# Patient Record
Sex: FEMALE | Race: BLACK OR AFRICAN AMERICAN | NOT HISPANIC OR LATINO | Employment: STUDENT | URBAN - METROPOLITAN AREA
[De-identification: names, ages, dates, MRNs, and addresses within clinical notes are randomized per-mention and may not be internally consistent; named-entity substitution may affect disease eponyms.]

---

## 2023-05-15 ENCOUNTER — OFFICE VISIT (OUTPATIENT)
Dept: PRIMARY CARE | Facility: CLINIC | Age: 18
End: 2023-05-15
Payer: COMMERCIAL

## 2023-05-15 VITALS
WEIGHT: 108 LBS | SYSTOLIC BLOOD PRESSURE: 130 MMHG | BODY MASS INDEX: 21.2 KG/M2 | DIASTOLIC BLOOD PRESSURE: 80 MMHG | HEIGHT: 60 IN | OXYGEN SATURATION: 98 % | HEART RATE: 116 BPM

## 2023-05-15 DIAGNOSIS — Z11.1 SCREENING-PULMONARY TB: ICD-10-CM

## 2023-05-15 DIAGNOSIS — E55.9 VITAMIN D DEFICIENCY: Primary | ICD-10-CM

## 2023-05-15 PROCEDURE — 3008F BODY MASS INDEX DOCD: CPT | Performed by: STUDENT IN AN ORGANIZED HEALTH CARE EDUCATION/TRAINING PROGRAM

## 2023-05-15 PROCEDURE — 99203 OFFICE O/P NEW LOW 30 MIN: CPT | Performed by: STUDENT IN AN ORGANIZED HEALTH CARE EDUCATION/TRAINING PROGRAM

## 2023-05-15 PROCEDURE — 82306 VITAMIN D 25 HYDROXY: CPT

## 2023-05-15 PROCEDURE — 1036F TOBACCO NON-USER: CPT | Performed by: STUDENT IN AN ORGANIZED HEALTH CARE EDUCATION/TRAINING PROGRAM

## 2023-05-15 PROCEDURE — 86481 TB AG RESPONSE T-CELL SUSP: CPT

## 2023-05-15 NOTE — PATIENT INSTRUCTIONS
Thank you for coming today Karine!    Please get your blood work done. The lab is on floor 1 in [address] or on [address] in [address].     I will fill out physical form and guardianship papers today and give you a call for when they are ready.     I will see you in December for your physical and as needed.    Have a great summer!

## 2023-05-15 NOTE — PROGRESS NOTES
Subjective   Patient ID: Karine Garcia is a 17 y.o. female with history of CP (bilateral hand stiffness), ASD, acne who presents to \[facility]\"" Care. Patient was previously seen in [facility] adolescent clinic with last well visit 12/6/2022.    HPI  Concerns today: Needs TB test and papers filled out    Chronic issues:  #CP  -Surgery in achilles tendons as a child due to stiffness at [facility] -- walks well   -Hand and arms stiffness-- mom states that this is mild, she can write and do crafts   -Saw OT/PT while at school    #ASD    Health Maintenance:  Immunizations: Up to date including bexsero   -Used to take a MVI, not currently     Social history:  -Going to start a day program soon -- [employer], needs paperwork  -Graduated from [employer] in 2022  -Mom works evenings at [employer]-- days off are Mondays and Thursdays  -Lives with mom, uncle, twin Zakiya, brother Gregor, sister Ashleigh  -Likes biking, likes the color pink, likes making jewelry   -Drinks water, drinks some juice, picky about certain foods, no veggies or fruit    Family history:   -Sister with type I DM  -DM runs in family on both sides  -HTN on both sides  -No breast cancer or colon cancer in family   -Uncle with HF (mild)    Objective   Visit Vitals  /80   Pulse (!) 116   Ht 1.524 m (5')   Wt 49 kg (108 lb)   SpO2 98%   BMI 21.09 kg/m²   Smoking Status Never   BSA 1.44 m²      Physical Exam  General: Well appearing, sitting next to her sister calmly, rocks sometimes and makes a lot of hand movements, repeats certain words her mom says, sometimes calls out spontaneously, in no acute distress  HEENT: EOMI, PERRL, nares patent without congestion, MMM  CV: RRR, HR ~90, no murmurs  Resp: Lungs CTAB, normal work of breathing  GI: Soft, nondistended, nontender, BS+   Ext: No lower ext swelling  Skin: Warm, dry, no rashes  Neuro: Awake, alert, moving all 4 extremities, mild stiffness in her fingers-has full range of fingers bilaterally, normal gait,  ambulates without assistance    Assessment/Plan   Karine Garcia is a 17 y.o. female with CP, ASD, and vitamin D deficiency who presents to [facility].      Chronic issues:  #CP  -S/p procedures on achilles tendons as a child, now ambulates well without assistance  -Some residual hypertonicity in upper extremities     #ASD  -Cooperative without behavioral issues    Health Maintenance:  Immunizations: Up to date including bexsero   -Used to take a MVI, not currently   -Labs: TB spot, Vitamin D     Problem List Items Addressed This Visit    None  Visit Diagnoses       Vitamin D deficiency    -  Primary    Relevant Orders    Vitamin D, Total (Completed)    Screening-pulmonary TB        Relevant Orders    T-Spot TB                 Cyn Nunez MD MPH

## 2023-05-16 LAB — CALCIDIOL (25 OH VITAMIN D3) (NG/ML) IN SER/PLAS: 15 NG/ML

## 2023-05-17 PROBLEM — L70.9 ACNE: Status: ACTIVE | Noted: 2023-05-17

## 2023-05-17 PROBLEM — F84.0 AUTISTIC DISORDER (HHS-HCC): Status: ACTIVE | Noted: 2023-05-17

## 2023-05-17 PROBLEM — E55.9 VITAMIN D INSUFFICIENCY: Status: ACTIVE | Noted: 2023-05-17

## 2023-05-17 PROBLEM — R62.50 DEVELOPMENTAL DELAY: Status: ACTIVE | Noted: 2023-05-17

## 2023-05-17 RX ORDER — BENZOYL PEROXIDE 5 G/100G
GEL TOPICAL NIGHTLY
COMMUNITY

## 2023-05-18 LAB
NIL(NEG) CONTROL SPOT COUNT: NORMAL
PANEL A SPOT COUNT: 0
PANEL B SPOT COUNT: 0
POS CONTROL SPOT COUNT: NORMAL
T-SPOT. TB INTERPRETATION: NEGATIVE

## 2023-05-24 DIAGNOSIS — E55.9 VITAMIN D DEFICIENCY: Primary | ICD-10-CM

## 2023-05-24 RX ORDER — CHOLECALCIFEROL (VITAMIN D3) 50 MCG
50 TABLET ORAL DAILY
Qty: 90 TABLET | Refills: 3 | Status: SHIPPED | OUTPATIENT
Start: 2023-05-24 | End: 2024-05-23

## 2023-11-27 RX ORDER — INSULIN GLARGINE 100 [IU]/ML
INJECTION, SOLUTION SUBCUTANEOUS
COMMUNITY
Start: 2023-08-11 | End: 2023-11-27 | Stop reason: WASHOUT

## 2023-11-27 RX ORDER — INSULIN LISPRO 100 [IU]/ML
INJECTION, SOLUTION INTRAVENOUS; SUBCUTANEOUS
COMMUNITY
Start: 2021-11-22

## 2023-11-27 RX ORDER — INSULIN LISPRO 100 [IU]/ML
INJECTION, SUSPENSION SUBCUTANEOUS
COMMUNITY
Start: 2023-08-25

## 2023-11-30 ENCOUNTER — OFFICE VISIT (OUTPATIENT)
Dept: PEDIATRIC ENDOCRINOLOGY | Facility: CLINIC | Age: 18
End: 2023-11-30
Payer: COMMERCIAL

## 2023-11-30 VITALS
SYSTOLIC BLOOD PRESSURE: 124 MMHG | BODY MASS INDEX: 28.74 KG/M2 | HEIGHT: 62 IN | HEART RATE: 108 BPM | DIASTOLIC BLOOD PRESSURE: 68 MMHG | WEIGHT: 156.2 LBS | RESPIRATION RATE: 20 BRPM

## 2023-11-30 DIAGNOSIS — E10.9 TYPE 1 DIABETES, HBA1C GOAL < 7% (MULTI): Primary | ICD-10-CM

## 2023-11-30 LAB — POC HEMOGLOBIN A1C: 12.9 % (ref 4.2–6.5)

## 2023-11-30 PROCEDURE — 83036 HEMOGLOBIN GLYCOSYLATED A1C: CPT | Performed by: PEDIATRICS

## 2023-11-30 PROCEDURE — 99214 OFFICE O/P EST MOD 30 MIN: CPT | Performed by: PEDIATRICS

## 2023-11-30 PROCEDURE — 3074F SYST BP LT 130 MM HG: CPT | Performed by: PEDIATRICS

## 2023-11-30 PROCEDURE — 95251 CONT GLUC MNTR ANALYSIS I&R: CPT | Performed by: PEDIATRICS

## 2023-11-30 PROCEDURE — 3078F DIAST BP <80 MM HG: CPT | Performed by: PEDIATRICS

## 2023-11-30 RX ORDER — INSULIN GLARGINE 100 [IU]/ML
INJECTION, SOLUTION SUBCUTANEOUS
Qty: 10 ML | Refills: 12 | Status: SHIPPED | OUTPATIENT
Start: 2023-11-30

## 2023-11-30 RX ORDER — SYRGE-NDL,INS 0.3 ML HALF MARK 30 G X1/2"
SYRINGE, EMPTY DISPOSABLE MISCELLANEOUS
COMMUNITY
Start: 2023-11-03

## 2023-11-30 RX ORDER — CALCIUM CITRATE/VITAMIN D3 200MG-6.25
TABLET ORAL
COMMUNITY
Start: 2016-07-06

## 2023-11-30 RX ORDER — FLASH GLUCOSE SENSOR
KIT MISCELLANEOUS
COMMUNITY
Start: 2023-08-25 | End: 2024-02-02 | Stop reason: SDUPTHER

## 2023-11-30 RX ORDER — CHLORPHENIR/PHENYLEPH/ASPIRIN 2-7.8-325
TABLET, EFFERVESCENT ORAL
COMMUNITY

## 2023-11-30 RX ORDER — GLUCAGON 3 MG/1
POWDER NASAL
COMMUNITY
Start: 2023-02-13

## 2023-11-30 NOTE — PROGRESS NOTES
0Subjective   Alma Bar is a 18 y.o. female with type 1 diabetes.   Today Alma presents to clinic with her mother.     HPI    Taking 25 75/25 BID; not out of bed by 10a.  Takes first shot at 4-5pm before work.  Second shot 12a and has a dinner.  If high (over 400) 35 units  If middle (over 300) 30 units  If 200's or lower 25 units    Rarely checks ketones   States glucoses rise when having her period     Goals         get a1c down (pt-stated)             Date of Diabetes Diagnosis: 01/01/08  Antibody Status at Diagnosis: Ab+  CGM Type: Freestyle Hal  Time in range 70-180mg/dL (%): 19  Time low <70mg/dL (%): 3  ED/Hospitalizations related to Diabetes: No  ED/Hospitalization not related to Diabetes: No  ED/Hospitalization related to DKA: No  Severe Hypoglycemia (coma, seizure, disorientation, or the need for high dose glucagon) since last visit: No    Last eye appointment:  overdue  Flu shot:  declines  Annual labs done 2/2023, overdue for urine       Review of Systems   Constitutional:  Negative for activity change, appetite change, diaphoresis, fatigue and unexpected weight change.   HENT:  Negative for congestion, sore throat and voice change.    Respiratory:  Negative for cough, shortness of breath and wheezing.    Cardiovascular:  Negative for chest pain and palpitations.   Gastrointestinal:  Negative for abdominal pain, constipation, diarrhea, nausea and vomiting.   Endocrine: Negative for cold intolerance, heat intolerance, polydipsia, polyphagia and polyuria.   Genitourinary:  Negative for enuresis.   Musculoskeletal:  Negative for arthralgias and myalgias.   Skin:  Negative for rash.   Neurological:  Negative for seizures, weakness and headaches.   Psychiatric/Behavioral:  Negative for dysphoric mood and sleep disturbance. The patient is not nervous/anxious.    All other systems reviewed and are negative.      Objective   /68 (BP Location: Right arm, Patient Position: Sitting, BP Cuff Size:  "Adult)   Pulse 108   Resp 20   Ht 1.574 m (5' 1.96\")   Wt 70.9 kg (156 lb 3.2 oz)   BMI 28.61 kg/m²      Lab  POC HEMOGLOBIN A1c   Date Value Ref Range Status   11/30/2023 12.9 (A) 4.2 - 6.5 % Final       Physical Exam  Vitals reviewed. Exam conducted with a chaperone present.   Constitutional:       General: She is not in acute distress.     Appearance: Normal appearance.   HENT:      Head: Normocephalic.      Mouth/Throat:      Mouth: Mucous membranes are moist.   Eyes:      Conjunctiva/sclera: Conjunctivae normal.   Neck:      Comments: Normal thyroid, no nodules  Pulmonary:      Effort: Pulmonary effort is normal.   Skin:     General: Skin is warm.      Comments: No lipoatrophy or lipohypertrophy   Neurological:      General: No focal deficit present.      Mental Status: She is alert and oriented to person, place, and time.   Psychiatric:         Mood and Affect: Mood normal.         Behavior: Behavior normal.          Assessment/Plan   Type 1 diabetes, HbA1c above target. On twice daily pre-mixed insulin.  Has tried basal bolus and insulin pump, returned to current regimen  Since all meals are in the afternoon,evening will switch to once a day premixed insulin to cover before work snack and dinner and then one shot of basal insulin.  Family agreeable.   BP ok  Needs eye exam  Needs urine albumin, up to date on other annual labs     Insulin Instructions  at first meal of day   HumaLOG Mix 75-25(U-100)Insuln 100 unit/mL (75-25) suspension   Last edited by Dilia Kirby RN on 11/30/2023 at 4:19 PM      Time of Day Dose (units)   Humalog 75/25 25   Lantus 25   if over 300 at breakfast lunch or dinner:  Lispro 5       CGM Interpretation/Plan  CGM downloaded and reviewed with patient and/or guardian. Summary stats as above, report printed and scanned into EMR. Highly variable Bgs, mostly high, but low overnight if doesn't eat evening meal.  Will transition to basal insulin with one shot a day of premixed " insulin.

## 2023-11-30 NOTE — PATIENT INSTRUCTIONS
We are going to change your insulin plan.  At 4 pm take 25 units of 75/25 to cover meals  At 4 pm take 25 units of Lantus to cover blood sugars inbetween meals    If blood glucose over 300 give 5 units of Lispro.

## 2023-12-08 ASSESSMENT — ENCOUNTER SYMPTOMS
FATIGUE: 0
SLEEP DISTURBANCE: 0
COUGH: 0
DIAPHORESIS: 0
HEADACHES: 0
SHORTNESS OF BREATH: 0
DYSPHORIC MOOD: 0
POLYPHAGIA: 0
SEIZURES: 0
VOICE CHANGE: 0
VOMITING: 0
APPETITE CHANGE: 0
POLYDIPSIA: 0
ACTIVITY CHANGE: 0
WHEEZING: 0
UNEXPECTED WEIGHT CHANGE: 0
DIARRHEA: 0
ABDOMINAL PAIN: 0
NERVOUS/ANXIOUS: 0
NAUSEA: 0
CONSTIPATION: 0
PALPITATIONS: 0
WEAKNESS: 0
SORE THROAT: 0
ARTHRALGIAS: 0
MYALGIAS: 0

## 2023-12-26 RX ORDER — INSULIN PUMP CONTROLLER
EACH MISCELLANEOUS
COMMUNITY
Start: 2023-05-17

## 2023-12-26 RX ORDER — CHLORPHENIR/PHENYLEPH/ASPIRIN 2-7.8-325
TABLET, EFFERVESCENT ORAL
COMMUNITY
Start: 2023-05-11

## 2024-02-02 DIAGNOSIS — E10.9 TYPE 1 DIABETES, HBA1C GOAL < 7% (MULTI): ICD-10-CM

## 2024-02-02 RX ORDER — FLASH GLUCOSE SENSOR
KIT MISCELLANEOUS
Qty: 2 EACH | Refills: 11 | Status: SHIPPED | OUTPATIENT
Start: 2024-02-02

## 2024-03-14 ENCOUNTER — OFFICE VISIT (OUTPATIENT)
Dept: PEDIATRIC ENDOCRINOLOGY | Facility: CLINIC | Age: 19
End: 2024-03-14
Payer: COMMERCIAL

## 2024-03-14 VITALS
WEIGHT: 152.34 LBS | DIASTOLIC BLOOD PRESSURE: 76 MMHG | HEIGHT: 62 IN | RESPIRATION RATE: 20 BRPM | SYSTOLIC BLOOD PRESSURE: 115 MMHG | BODY MASS INDEX: 28.03 KG/M2 | HEART RATE: 105 BPM

## 2024-03-14 DIAGNOSIS — E55.9 VITAMIN D DEFICIENCY: ICD-10-CM

## 2024-03-14 DIAGNOSIS — E10.9 TYPE 1 DIABETES, HBA1C GOAL < 7% (MULTI): Primary | ICD-10-CM

## 2024-03-14 DIAGNOSIS — E10.9 TYPE 1 DIABETES MELLITUS WITHOUT COMPLICATION (MULTI): ICD-10-CM

## 2024-03-14 LAB
POC HEMOGLOBIN A1C: 12.6 % (ref 4.2–6.5)
POC HEMOGLOBIN A1C: 12.6 % (ref 4.2–6.5)

## 2024-03-14 PROCEDURE — 95251 CONT GLUC MNTR ANALYSIS I&R: CPT | Performed by: PEDIATRICS

## 2024-03-14 PROCEDURE — 83036 HEMOGLOBIN GLYCOSYLATED A1C: CPT | Performed by: PEDIATRICS

## 2024-03-14 PROCEDURE — 3078F DIAST BP <80 MM HG: CPT | Performed by: PEDIATRICS

## 2024-03-14 PROCEDURE — 3074F SYST BP LT 130 MM HG: CPT | Performed by: PEDIATRICS

## 2024-03-14 PROCEDURE — 1036F TOBACCO NON-USER: CPT | Performed by: PEDIATRICS

## 2024-03-14 PROCEDURE — 99214 OFFICE O/P EST MOD 30 MIN: CPT | Performed by: PEDIATRICS

## 2024-03-14 ASSESSMENT — ENCOUNTER SYMPTOMS: BACK PAIN: 1

## 2024-03-14 NOTE — PROGRESS NOTES
"Subjective   Alma Bar is a 18 y.o. female with type 1 diabetes. Last seen 11/20/23  Today Alma presents to clinic with her mother.     HPI  Other Medical History:      Manages diabetes with  Lantus and Humalog 75/25, prn lispro of >300  Current Insulin Instructions  at first meal of day   HumaLOG Mix 75-25(U-100)Insuln 100 unit/mL (75-25) suspension   Last edited by Dilia Kirby RN on 3/14/2024 at 2:40 PM      Both insulins (75/25 and Lantus) at 6pm daily      Time of Day Dose (units)   Humalog 75/25 25   Lantus 25   if over 300 at breakfast lunch or dinner:  Lispro 5         -TDD: ~50 units  -Total daily basal: ~44 units  -Basal %: about 66  -SG average: 345   -CGM wear time (%): sensor is active 38% of the time, 1 scan per day  -Daily carb average: not counting carbs     Concerns at this visit:   denies complaints.  Concerned about low iron.       Social:    Living at home, not working.  Screens:  Eye exam: summer 2023, Dr Oconnell  Labs: 2/2023  Flu shot: declines       Insulin Injections/Pump sites:   - Gives mealtime insulin during eating.  - Site rotation: arms, legs, bum     Carbohydrate counting:   - Patient states they are not counting counting carbs.       Other:   Hypoglycemia (patient reports):  - uses food to treat lows.  Crackers, juice, chips  - treats with 15-30 gms carbs  - Nocturnal hypoglycemia: no (however see Glucose monitoring section  Checks ketones with: if \"high\"--over 300 and not decreasing.  Had small ketones     Exercise:      Education Reviewed:      Goals         get a1c down (pt-stated)             Date of Diabetes Diagnosis: 04/01/08  Antibody Status at Diagnosis: Ab+  CGM Type: Freestyle Hal  Hypoglycemia Unawareness : No  ED/Hospitalizations related to Diabetes: No  ED/Hospitalization not related to Diabetes: No  ED/Hospitalization related to DKA: No  Severe Hypoglycemia (coma, seizure, disorientation, or the need for high dose glucagon) since last visit: No     " "    Review of Systems   Musculoskeletal:  Positive for back pain.        Between shoulder blades.  Stretches, back rub, no meds. Not severe   All other systems reviewed and are negative.      Objective   /76 (BP Location: Right arm, Patient Position: Sitting)   Pulse 105   Resp 20   Ht 1.568 m (5' 1.73\")   Wt 69.1 kg (152 lb 5.4 oz)   BMI 28.11 kg/m²      Physical Exam Alert, well- hydrated  PERRL  Thyroid of normal size and consistency  Hyperpigmented macules on forehead  Normal injection sites.  Normal finger/lancet sites  Foot - visual foot inspection wihtout calluses.  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  DTR 2+/4+ with normal relaxation phase      Lab    POC HEMOGLOBIN A1c   Date Value Ref Range Status   03/14/2024 12.6 (A) 4.2 - 6.5 % Final     Lab Results   Component Value Date    HGBA1C 12.6 (A) 03/14/2024    HGBA1C 12.6 (A) 03/14/2024    HGBA1C 12.9 (A) 11/30/2023    HGBA1C 12.9 (A) 11/16/2021       Assessment/Plan   Alma Bar is a 18 y.o. female with Type 1 diabetes x nearly 16 yrs (date verified from original records) - small reduction in A1C though still high      Continues to be uncomfortable changing from current insulin regimen    - suspect presence of cheiroarthropathy which is associated with other long term complications, however no previous comments on exams (back to 2019) regarding its presence or absence AND cannot exclude trigger finger, congenital (inquire about this at next visit)    - BG pattern suggests that may be experiencing hypoglycemia when gives 5 U for nocturnal hyperglycemia.  Discussed option of reducing dose to 3 Units if hyperglycemic but not plans for CHO meal, which she agreed to.    - improve data from CGM to identify whether Lantus has insufficient duration to allow " optimal BG and if so, consider changing to Tresiba as well as assessing whether 75/25 dose can be adjusted   Surveillance - due for annual labs    - last eye exam prior to 10/1/23 in  ophtho dept was 8/3/16    GLUCOSE MONITORING: With Hal that requires scanning q 6-8 hrs to avoid gaps has resulted in data for 38% of time over past 14 days with BG distribution: < 70: 3%,  mg/dl: 8%, >180: 89%    - 3/5 and 3/6 detected hypoglycemia from 2-4 PM and 5-6AM, respectively while possibly occurred 3/13 at about 3:30 AM after had given self 5 Units of Lispro       Plan:    Either increase frequency of scanning Hal 2 OR change to Hal 3 in order to obtain 24 hr daily data      - susannah uploaded to phone at end of today's visit       When no gaps in data, will try to assess patter and consider whether could be more beneficial to use Tresiba instead of Lantus    2.  Determine from data whether any change in Lantus dose OR change to longer duration of action with Tresiba  3. Reduce correction dose for hyperglycemia if not accompanied by CHO intake (agnes at bedtime)     Insulin Instructions  at first meal of day   HumaLOG Mix 75-25(U-100)Insuln 100 unit/mL (75-25) suspension   Last edited by Gracy Aviles MD on 3/14/2024 at 6:30 PM      Both insulins (75/25 and Lantus) at 6pm daily    If you are over 300 at bedtime or you are not eating take 3 units lispro      Time of Day Dose (units)   Humalog 75/25 25   Lantus 25   if over 300 at breakfast lunch or dinner:  Lispro 5     CGM INTERPRETATION plan: 14 day CGM download was reviewed in detail as documented above under GLUCOSE MONITORING and will be attached to chart.  A minimum of 72 total hours of glucose data was used to inform the management plan outlined above.      Gracy Aviles MD

## 2024-03-14 NOTE — PATIENT INSTRUCTIONS
Thanks for coming today!    Try to Freestyle Hal 3. If you like it, let us know and we will update your prescriptions.  We recommend the Hal 3-- you do not have to scan.  It will give you and us more information.    Our clinic code is:  rbcdiabetes  If you stay with the Hal 2--try to scan 4 times daily    We are trying to help you get rid of overnight lows so:  if you are over 300 and you are not going to eat, take 3 units of lispro.  If you are over 300 at meals, take 5 units lispro.  Continue 25 units Humalog 75/25 and 25 units Lantus at your first meal of the day.    Get your annual labs done:  fasting for 12 hours before a blood draw.  Nothing but water that day and a day when you are not low when you wake up.    Return to clinic in 3 months with Dilia Kirby RN

## 2024-07-18 ENCOUNTER — LAB (OUTPATIENT)
Dept: LAB | Facility: LAB | Age: 19
End: 2024-07-18
Payer: COMMERCIAL

## 2024-07-18 ENCOUNTER — APPOINTMENT (OUTPATIENT)
Dept: PEDIATRIC ENDOCRINOLOGY | Facility: CLINIC | Age: 19
End: 2024-07-18
Payer: COMMERCIAL

## 2024-07-18 VITALS
DIASTOLIC BLOOD PRESSURE: 72 MMHG | WEIGHT: 150.57 LBS | HEIGHT: 62 IN | SYSTOLIC BLOOD PRESSURE: 109 MMHG | HEART RATE: 114 BPM | BODY MASS INDEX: 27.71 KG/M2

## 2024-07-18 DIAGNOSIS — E10.9 TYPE 1 DIABETES, HBA1C GOAL < 7% (MULTI): ICD-10-CM

## 2024-07-18 LAB — POC HEMOGLOBIN A1C: 11.7 % (ref 4.2–6.5)

## 2024-07-18 PROCEDURE — 84443 ASSAY THYROID STIM HORMONE: CPT

## 2024-07-18 PROCEDURE — 82306 VITAMIN D 25 HYDROXY: CPT

## 2024-07-18 PROCEDURE — 83036 HEMOGLOBIN GLYCOSYLATED A1C: CPT | Performed by: PEDIATRICS

## 2024-07-18 PROCEDURE — 80053 COMPREHEN METABOLIC PANEL: CPT

## 2024-07-18 PROCEDURE — 82465 ASSAY BLD/SERUM CHOLESTEROL: CPT

## 2024-07-18 PROCEDURE — 3008F BODY MASS INDEX DOCD: CPT | Performed by: PEDIATRICS

## 2024-07-18 PROCEDURE — 3078F DIAST BP <80 MM HG: CPT | Performed by: PEDIATRICS

## 2024-07-18 PROCEDURE — 36415 COLL VENOUS BLD VENIPUNCTURE: CPT

## 2024-07-18 PROCEDURE — 95251 CONT GLUC MNTR ANALYSIS I&R: CPT | Performed by: PEDIATRICS

## 2024-07-18 PROCEDURE — 3074F SYST BP LT 130 MM HG: CPT | Performed by: PEDIATRICS

## 2024-07-18 PROCEDURE — 83718 ASSAY OF LIPOPROTEIN: CPT

## 2024-07-18 PROCEDURE — 99214 OFFICE O/P EST MOD 30 MIN: CPT | Performed by: PEDIATRICS

## 2024-07-18 RX ORDER — INSULIN GLARGINE 100 [IU]/ML
INJECTION, SOLUTION SUBCUTANEOUS
Qty: 10 ML | Refills: 12 | Status: SHIPPED | OUTPATIENT
Start: 2024-07-18

## 2024-07-18 RX ORDER — INSULIN LISPRO 100 [IU]/ML
INJECTION, SOLUTION INTRAVENOUS; SUBCUTANEOUS
Qty: 10 ML | Refills: 11 | Status: SHIPPED | OUTPATIENT
Start: 2024-07-18

## 2024-07-18 RX ORDER — INSULIN LISPRO 100 [IU]/ML
INJECTION, SUSPENSION SUBCUTANEOUS
Qty: 10 ML | Refills: 11 | Status: SHIPPED | OUTPATIENT
Start: 2024-07-18

## 2024-07-18 NOTE — PATIENT INSTRUCTIONS
It was good to see you today!  Good job lowering your A1c from 12.6 to 11.7%!    No dose changes today.  If your blood sugar is over 300 and you take either 3 or 5 units lispro for it, please wait 3 hours and then you can take another 3 units.  Insulin Instructions  at first meal of day            Both insulins (75/25 and Lantus) at first meal of the day    If you are over 300 at bedtime or you are not eating take 3 units lispro      Time of Day Dose (units)   Humalog 75/25 25   Lantus 25   if over 300 at breakfast lunch or dinner:  Lispro 5        Get your annual labs done today.          Make an appointment for [facility] for Life    Make an eye doctor appointment.    Return to clinic in 3 months to see nurse Dilia Kirby RN    [phone] weekdays 830-5pm  [phone] weekends or after 5pm weekdays   [email or web address]

## 2024-07-18 NOTE — PROGRESS NOTES
"Subjective   Alma Bar is a 18 y.o. female with type 1 diabetes.   Today Alma presents to clinic with her mother.     HPI  Other Medical History:      Manages diabetes with Lantus, Humalog 75/25, lispro.  Takes at first meal of the aft ~2p  Current doses  at first meal of day   HumaLOG Mix 75-25(U-100)Insuln 100 unit/mL (75-25) suspension         Both insulins (75/25 and Lantus) at 6pm daily    If you are over 300 at bedtime or you are not eating take 3 units lispro      Time of Day Dose (units)   Humalog 75/25 25   Lantus 25   if over 300 at breakfast lunch or dinner:  Lispro 5       Alma has been having frequent hypoglycemia after correcting hypoglycemia.  Has been routinely giving herself 30 units instead of 5 units so glucose will drop quickly    -TDD: 55+ units  -Total daily basal: ~35  -Basal %: ~45  -sensor average: 305   -CGM wear time (%): 89  -     Concerns at this visit: requesting food for life -- thinks request  before she got to use it         Social:      Screens:  Eye exam: >1 year, will make appointment  Labs: 3/2024  Flu shot: declines  Depression screen: n/d     Insulin Injections/Pump sites:   - Gives  insulin with breakfast/first meal of the day.--  - Site rotation: arms legs bum     Carbohydrate counting: Does not count carbs       Other:   Hypoglycemia:  - uses chips and juice to treat lows  - treats with 15-30 gms carbs  - Nocturnal hypoglycemia: yes--  has been taking 30 units lispro if glucose is \"hig\"  Checks ketones with: no highs with ketones, checks if over 300-400     Exercise: stretch/yoga     Education Reviewed:      Goals         get a1c down (pt-stated)             Date of Diabetes Diagnosis: 08  Antibody Status at Diagnosis: Ab+  CGM Type: Freestyle Hal  Using AID System: No  Hypoglycemia Unawareness : No  ED/Hospitalizations related to Diabetes: No  ED/Hospitalization not related to Diabetes: No  ED/Hospitalization related to DKA: No  Severe Hypoglycemia " "(coma, seizure, disorientation, or the need for high dose glucagon) since last visit: No         Review of Systems   All other systems reviewed and are negative.      Objective   /72   Pulse (!) 114   Ht 1.585 m (5' 2.4\")   Wt 68.3 kg (150 lb 9.2 oz)   BMI 27.19 kg/m²      Physical Exam  Vitals reviewed. Exam conducted with a chaperone present.   Constitutional:       General: She is not in acute distress.     Appearance: Normal appearance.   HENT:      Head: Normocephalic.      Mouth/Throat:      Mouth: Mucous membranes are moist.   Eyes:      Conjunctiva/sclera: Conjunctivae normal.   Neck:      Comments: Normal thyroid, no nodules  Pulmonary:      Effort: Pulmonary effort is normal.   Skin:     General: Skin is warm.      Comments: No lipoatrophy or lipohypertrophy   Neurological:      General: No focal deficit present.      Mental Status: She is alert and oriented to person, place, and time.   Psychiatric:         Mood and Affect: Mood normal.         Behavior: Behavior normal.          Lab  Lab Results   Component Value Date    HGBA1C 11.7 (A) 07/18/2024    HGBA1C 12.6 (A) 03/14/2024    HGBA1C 12.6 (A) 03/14/2024    HGBA1C 12.9 (A) 11/30/2023       Assessment/Plan   Alma Bar is a 18 y.o. female with type 1 diabetes. HbA1c above target with interval decrease since the last visit. However having significant hypoglycemia, giving wrong correction dose.  Reviewed appropriate dose for correction with Alma.  Good BP  Due for annual labs and eye exam  Food for life referal           Insulin Instructions  at first meal of day   HumaLOG Mix 75-25(U-100)Insuln 100 unit/mL (75-25) suspension   Last edited by Gracy Aviles MD on 3/14/2024 at 6:30 PM      Both insulins (75/25 and Lantus) at 6pm daily    If you are over 300 at bedtime or you are not eating take 3 units lispro      Time of Day Dose (units)   Humalog 75/25 25   Lantus 25   if over 300 at breakfast lunch or dinner:  Lispro 5       CGM " Interpretation/Plan   14 day CGM download was reviewed in detail as documented above under GLUCOSE MONITORING and will be attached to chart.  A minimum of 72 hours of glucose data was used to inform the management plan outlined above.    Dilia Kirby RN

## 2024-07-19 DIAGNOSIS — E10.9 TYPE 1 DIABETES, HBA1C GOAL < 7% (MULTI): Primary | ICD-10-CM

## 2024-07-19 LAB
25(OH)D3 SERPL-MCNC: 8 NG/ML (ref 30–100)
ALBUMIN SERPL BCP-MCNC: 3.4 G/DL (ref 3.4–5)
ALP SERPL-CCNC: 128 U/L (ref 33–110)
ALT SERPL W P-5'-P-CCNC: 31 U/L (ref 7–45)
ANION GAP SERPL CALC-SCNC: 21 MMOL/L (ref 10–20)
AST SERPL W P-5'-P-CCNC: 61 U/L (ref 9–39)
BILIRUB SERPL-MCNC: 0.3 MG/DL (ref 0–1.2)
BUN SERPL-MCNC: 8 MG/DL (ref 6–23)
CALCIUM SERPL-MCNC: 8.8 MG/DL (ref 8.6–10.6)
CHLORIDE SERPL-SCNC: 98 MMOL/L (ref 98–107)
CHOLEST SERPL-MCNC: 184 MG/DL (ref 0–199)
CHOLESTEROL/HDL RATIO: 2.8
CO2 SERPL-SCNC: 23 MMOL/L (ref 21–32)
CREAT SERPL-MCNC: 0.73 MG/DL (ref 0.5–1.05)
EGFRCR SERPLBLD CKD-EPI 2021: >90 ML/MIN/1.73M*2
GLUCOSE SERPL-MCNC: 466 MG/DL (ref 74–99)
HDLC SERPL-MCNC: 65.5 MG/DL
NON-HDL CHOLESTEROL: 119 MG/DL (ref 0–119)
POTASSIUM SERPL-SCNC: 4.9 MMOL/L (ref 3.5–5.3)
PROT SERPL-MCNC: 6.8 G/DL (ref 6.4–8.2)
SODIUM SERPL-SCNC: 137 MMOL/L (ref 136–145)
TSH SERPL-ACNC: 0.86 MIU/L (ref 0.44–3.98)

## 2024-07-29 DIAGNOSIS — E55.9 VITAMIN D DEFICIENCY: Primary | ICD-10-CM

## 2024-07-29 RX ORDER — ASPIRIN 325 MG
50000 TABLET, DELAYED RELEASE (ENTERIC COATED) ORAL
Qty: 8 CAPSULE | Refills: 0 | Status: SHIPPED | OUTPATIENT
Start: 2024-08-04 | End: 2024-09-23

## 2024-09-05 DIAGNOSIS — E10.9 TYPE 1 DIABETES, HBA1C GOAL < 7% (MULTI): ICD-10-CM

## 2024-09-05 RX ORDER — INSULIN LISPRO 100 [IU]/ML
INJECTION, SUSPENSION SUBCUTANEOUS
Qty: 10 ML | Refills: 11 | Status: SHIPPED | OUTPATIENT
Start: 2024-09-05

## 2024-09-05 RX ORDER — MULTIVITAMIN WITH IRON
TABLET ORAL
Qty: 30 TABLET | Refills: 11 | Status: SHIPPED | OUTPATIENT
Start: 2024-09-05

## 2024-10-02 DIAGNOSIS — E55.9 VITAMIN D DEFICIENCY: ICD-10-CM

## 2024-10-03 DIAGNOSIS — E10.9 TYPE 1 DIABETES, HBA1C GOAL < 7% (MULTI): ICD-10-CM

## 2024-10-03 RX ORDER — CHOLECALCIFEROL (VITAMIN D3) 25 MCG
1000 TABLET ORAL DAILY
Qty: 30 TABLET | Refills: 11 | Status: SHIPPED | OUTPATIENT
Start: 2024-10-03 | End: 2025-10-03

## 2024-10-03 RX ORDER — INSULIN LISPRO 100 [IU]/ML
INJECTION, SUSPENSION SUBCUTANEOUS
Qty: 10 ML | Refills: 11 | Status: SHIPPED | OUTPATIENT
Start: 2024-10-03

## 2024-10-03 NOTE — TELEPHONE ENCOUNTER
Spoke with mom. Alma has completed the 8 doses of cholecalciferal, 50,000 international units  weekly.  Script sent for vitamin D 1000 international units daily.  Will recheck vitamin D level at next appointment.

## 2024-10-17 ENCOUNTER — APPOINTMENT (OUTPATIENT)
Dept: PEDIATRIC ENDOCRINOLOGY | Facility: CLINIC | Age: 19
End: 2024-10-17
Payer: COMMERCIAL

## 2024-10-17 VITALS
SYSTOLIC BLOOD PRESSURE: 124 MMHG | WEIGHT: 142.2 LBS | HEIGHT: 62 IN | RESPIRATION RATE: 18 BRPM | BODY MASS INDEX: 26.17 KG/M2 | DIASTOLIC BLOOD PRESSURE: 77 MMHG | HEART RATE: 111 BPM

## 2024-10-17 DIAGNOSIS — E10.9 TYPE 1 DIABETES, HBA1C GOAL < 7% (MULTI): ICD-10-CM

## 2024-10-17 LAB — POC HEMOGLOBIN A1C: 11.7 % (ref 4.2–6.5)

## 2024-10-17 PROCEDURE — 3078F DIAST BP <80 MM HG: CPT | Performed by: PEDIATRICS

## 2024-10-17 PROCEDURE — 83036 HEMOGLOBIN GLYCOSYLATED A1C: CPT | Performed by: PEDIATRICS

## 2024-10-17 PROCEDURE — 3008F BODY MASS INDEX DOCD: CPT | Performed by: PEDIATRICS

## 2024-10-17 PROCEDURE — 3074F SYST BP LT 130 MM HG: CPT | Performed by: PEDIATRICS

## 2024-10-17 PROCEDURE — 95251 CONT GLUC MNTR ANALYSIS I&R: CPT | Performed by: PEDIATRICS

## 2024-10-17 PROCEDURE — 99214 OFFICE O/P EST MOD 30 MIN: CPT | Performed by: PEDIATRICS

## 2024-10-17 ASSESSMENT — ENCOUNTER SYMPTOMS
OCCASIONAL FEELINGS OF UNSTEADINESS: 0
DEPRESSION: 0
LOSS OF SENSATION IN FEET: 0

## 2024-10-17 NOTE — PROGRESS NOTES
Subjective   Alma Bar is a 19 y.o. female with type 1 diabetes.   Today Alam presents to clinic with her mother.     HPI  Other Medical History:      Manages diabetes with MDI and freestyle Hal 14 day with    Insulin Instructions  at first meal of day   HumaLOG Mix 75-25(U-100)Insuln 100 unit/mL (75-25) suspension   Last edited by Gracy Aviles MD on 3/14/2024 at 6:30 PM      Both insulins (75/25 and Lantus) at 6pm daily    If you are over 300 at bedtime or you are not eating take 3 units lispro      Time of Day Dose (units)   Humalog 75/25  morning 25   Lantus  at 9pm 25   if over 300 at breakfast lunch or dinner:  Lispro 5        Concerns at this visit: cannot swallow mvi tabs  Elevated glucose during menses--would like larger insulin doses during period     Social: looking for a place to work     Screens:  Eye exam: needs an appointment, will make today  Labs: 7/2024  low vitamin D, treated with weekly D3, started mvi.  Needs repeat vit D level  Flu shot: declines       Insulin Injections/Pump sites:   - Gives mealtime insulin after eating.  - Site rotation: arms thighs butt     Carbohydrate counting:   - Patient states they are n/a at counting carbs.--not counting carbs  - Patient states they are n/a at adherence to bolusing for carbs.--not counting  Other:   Hypoglycemia:  - uses OJ or crackers to treat lows  - treats with doesn't count gms carbs  - Nocturnal hypoglycemia: no  Checks ketones with: if over 400     Exercise: yoga every and exercise susannah, weight down 10 pounds     Education Reviewed:      Goals         get a1c down (pt-stated)             Date of Diabetes Diagnosis: 04/01/08  Antibody Status at Diagnosis: Ab+  CGM Type: Freestyle Hal  Using AID System: No  Time in range 70-180mg/dL (%): 13  Time low <70mg/dL (%): 2  Hypoglycemia Unawareness : No  ED/Hospitalizations related to Diabetes: No  ED/Hospitalization not related to Diabetes: No  ED/Hospitalization related to DKA:  "No  Severe Hypoglycemia (coma, seizure, disorientation, or the need for high dose glucagon) since last visit: No         Review of Systems    Objective   /77 (BP Location: Right arm, Patient Position: Sitting, BP Cuff Size: Adult)   Pulse (!) 111   Resp 18   Ht 1.575 m (5' 2.01\")   Wt 64.5 kg (142 lb 3.2 oz)   BMI 26.00 kg/m²      Physical Exam   General: Well nourished, no acute distress  HEENT: NCAT, MMM, eye movements grossly intact  Neck: Supple, no thyromegaly  CV: RRR  Pulm: Non labored breathing, CTAB  Skin: No visible rash, no lipohypertrophy  MSK: normal ROM  Ext: WWP  Neuro: CN grossly intact  Psych: alert, normal mood       Lab  Lab Results   Component Value Date    HGBA1C 11.7 (A) 10/17/2024    HGBA1C 11.7 (A) 07/18/2024    HGBA1C 12.6 (A) 03/14/2024    HGBA1C 12.6 (A) 03/14/2024       Assessment/Plan   Alma Bar is a 19 y.o. female with type 1 diabetes. A1c is stable but well above target at 11.7%.   CGM shows 13% TIR, 73% >250, 12%>180, 2%<70.   Not taking insulin correctly or consistently.   Difficult to assess an appropriate insulin plan for this family.  Plan:    --continue Lantus daily  --take Lispro 75/25 with biggest meal of the day  --Lispro 5 units if over 300 before a meal  --consider iLet pump  --labs UTD  --needs eye exam  --continue weekly vit D 50,000 units and daily flintstone MVI      Problem List Items Addressed This Visit    None  Visit Diagnoses         Codes    Type 1 diabetes, HbA1c goal < 7% (Multi)     E10.9               Insulin Instructions  daily doses   HumaLOG Mix 75-25(U-100)Insuln 100 unit/mL (75-25) suspension   Last edited by Dilia Kirby RN on 10/17/2024 at 3:11 PM          If you are over 300 at bedtime or you are not eating take 3 units lispro      Time of Day Dose (units)   Humalog 75/25, (9pm)largest meal of the day 25   Lantus at 9pm 25   if over 300 at breakfast lunch or dinner:  Lispro 5       CGM Interpretation/Plan   14 day CGM download was " reviewed in detail as documented above under GLUCOSE MONITORING and will be attached to chart.  A minimum of 72 hours of glucose data was used to inform the management plan outlined above.    Dilia Kirby RN

## 2024-10-17 NOTE — PATIENT INSTRUCTIONS
It was good to see you today!    Take the cholecalciferal (vitamin D) once weekly for 6 more weeks.  Take the Flinstone chewables daily.  Stop taking the small vitamin D pills you've been taking.    Make an eye appointment    Humalog 75/25 = 25 units before  your biggest meal of the day--9pm  Lantus 25 units at 9pm; 28 units with your period  Lispro:  5 units if over 300 before a meal, with food     Return to clinic in 3 months to see nurse Dilia Kirby RN    [phone] weekdays 830-5pm  [phone] weekends or after 5pm weekdays   [email or web address]

## 2024-11-04 ENCOUNTER — APPOINTMENT (OUTPATIENT)
Dept: OPHTHALMOLOGY | Facility: CLINIC | Age: 19
End: 2024-11-04
Payer: COMMERCIAL

## 2025-01-23 ENCOUNTER — DOCUMENTATION (OUTPATIENT)
Dept: PEDIATRIC ENDOCRINOLOGY | Facility: HOSPITAL | Age: 20
End: 2025-01-23

## 2025-01-23 ENCOUNTER — HOSPITAL ENCOUNTER (EMERGENCY)
Facility: HOSPITAL | Age: 20
Discharge: HOME | End: 2025-01-24
Payer: COMMERCIAL

## 2025-01-23 ENCOUNTER — APPOINTMENT (OUTPATIENT)
Dept: PEDIATRIC ENDOCRINOLOGY | Facility: CLINIC | Age: 20
End: 2025-01-23
Payer: COMMERCIAL

## 2025-01-23 VITALS
SYSTOLIC BLOOD PRESSURE: 116 MMHG | DIASTOLIC BLOOD PRESSURE: 74 MMHG | BODY MASS INDEX: 24.4 KG/M2 | HEIGHT: 62 IN | HEART RATE: 118 BPM | WEIGHT: 132.6 LBS | RESPIRATION RATE: 20 BRPM

## 2025-01-23 DIAGNOSIS — E10.9 TYPE 1 DIABETES, HBA1C GOAL < 7% (MULTI): ICD-10-CM

## 2025-01-23 DIAGNOSIS — E55.9 VITAMIN D DEFICIENCY: ICD-10-CM

## 2025-01-23 LAB
KETONES, POC: POSITIVE
POC HEMOGLOBIN A1C: 12.2 % (ref 4.2–6.5)

## 2025-01-23 PROCEDURE — 99214 OFFICE O/P EST MOD 30 MIN: CPT | Performed by: PEDIATRICS

## 2025-01-23 PROCEDURE — 81003 URINALYSIS AUTO W/O SCOPE: CPT | Performed by: PEDIATRICS

## 2025-01-23 PROCEDURE — 4500999001 HC ED NO CHARGE

## 2025-01-23 PROCEDURE — 95251 CONT GLUC MNTR ANALYSIS I&R: CPT | Performed by: PEDIATRICS

## 2025-01-23 PROCEDURE — 3074F SYST BP LT 130 MM HG: CPT | Performed by: PEDIATRICS

## 2025-01-23 PROCEDURE — 83036 HEMOGLOBIN GLYCOSYLATED A1C: CPT | Performed by: PEDIATRICS

## 2025-01-23 PROCEDURE — 3008F BODY MASS INDEX DOCD: CPT | Performed by: PEDIATRICS

## 2025-01-23 PROCEDURE — 3078F DIAST BP <80 MM HG: CPT | Performed by: PEDIATRICS

## 2025-01-23 RX ORDER — FLASH GLUCOSE SENSOR
KIT MISCELLANEOUS
Qty: 2 EACH | Refills: 11 | Status: SHIPPED | OUTPATIENT
Start: 2025-01-23

## 2025-01-23 RX ORDER — CHLORPHENIR/PHENYLEPH/ASPIRIN 2-7.8-325
TABLET, EFFERVESCENT ORAL
Qty: 50 EACH | Refills: 11 | Status: SHIPPED | OUTPATIENT
Start: 2025-01-23

## 2025-01-23 RX ORDER — INSULIN LISPRO 100 [IU]/ML
INJECTION, SUSPENSION SUBCUTANEOUS
Qty: 10 ML | Refills: 11 | Status: SHIPPED | OUTPATIENT
Start: 2025-01-23

## 2025-01-23 ASSESSMENT — PATIENT HEALTH QUESTIONNAIRE - PHQ9
1. LITTLE INTEREST OR PLEASURE IN DOING THINGS: MORE THAN HALF THE DAYS
2. FEELING DOWN, DEPRESSED OR HOPELESS: NOT AT ALL
10. IF YOU CHECKED OFF ANY PROBLEMS, HOW DIFFICULT HAVE THESE PROBLEMS MADE IT FOR YOU TO DO YOUR WORK, TAKE CARE OF THINGS AT HOME, OR GET ALONG WITH OTHER PEOPLE: NOT DIFFICULT AT ALL
SUM OF ALL RESPONSES TO PHQ9 QUESTIONS 1 & 2: 2

## 2025-01-23 NOTE — PATIENT INSTRUCTIONS
To rule out DKA, we have called the [facility]. Proceed to the ED at this time.    Your A1c today is elevated at 12.2%    Prescriptions sent for your Hal sensors, your ketone strips and the Humalog 75/25.  Call the pharmacy if when you are low on any of your prescriptions:  [phone]     Return to clinic in 2 months to see nurse Dilia Kirby RN    [phone] weekdays 830-5pm  [phone] weekends or after 5pm weekdays   [email or web address]

## 2025-01-23 NOTE — PROGRESS NOTES
[facility]  [facility]    Subjective   Alma Bar is a 19 y.o. female with type 1 diabetes.   Today Alma presents to clinic with her mother.     HPI       Manages diabetes with MDI and freestyle hal  Insulin plan, as given to patient at last visit:  Humalog 75/25 = 25 units before your biggest meal of the day--9pm   Lantus 25 units at 9pm; 28 units with your period   Lispro: 5 units if over 300 before a meal, with food    Alma and mom state she hasn't had the 75/25 since November because pharmacy never texted them that she was due for a refill.  She has been taking Lantus 20 units (25 if >400) at 3-4pm and then again at 8-10pm.         Concerns at this visit: weight loss--states doesn't feel like eating--nauseous, snacks on crackers, a lot of sleeping.  States maybe it's her period.  States it just started this week.  Mom says it's PMS     Social: not working right now, lives with mom     Insulin Injections/Pump sites:   Only taking Lantus twice daily, not with meals  - Site rotation: legs arms     Carbohydrate counting: does not count carbs     Other:   Hypoglycemia:  - uses juice sweets or candy or chips to treat lows  - treats with ? gms carbs  - Nocturnal hypoglycemia: yes-- last week  Checks ketones with: when she uses the restroom.  Had small ketones once.     Exercise:      Education Reviewed:      Goals         get a1c down (pt-stated)             Diabetes  Date of Diabetes Diagnosis: 04/01/08  Antibody status at diagnosis: Ab+  Type of Diabetes: Type 1  ROS:  positive for nausea with eating.  Eating crackers a few times per day.    Weight decreased 4 kg since last visit in October, 8 kg since last July  Insulin Delivery  Diabetes Management Regimen: MDI  Using Smart Pen device: No    Glucose Monitoring  How do you primarily monitor blood sugars?: CGM  CGM Type: Freestyle Hal  Time in range 70-180mg/dL (%): 22  Time low <70mg/dL (%): 4  Time high >180mg/dL  "(%): 74  Average Glucose: 269  Predicted GMI: 9.7    Clinical Details  Hypoglycemia Unawareness : No  Severe Hypoglycemia (coma, seizure, disorientation, or the need for high dose glucagon) since last visit: No    Hospitalizations (since last endocrine appointment)  ED/Hospitalizations related to Diabetes: No  ED/Hospitalization not related to Diabetes: No  ED/Hospitalization related to DKA: No    Education  Comprehensive Diabetes Education : 01/01/08    Screens  Eye Exam:  (coming up 4/23/25)  Labs: 07/18/24  Flu Shot: Not Applicable  Depression Screen: Yes  Depression Screen Date: 01/23/25  Counseling: Not applicable             Objective   /74 (BP Location: Right arm, Patient Position: Sitting)   Pulse (!) 118   Resp 20   Ht 1.581 m (5' 2.24\")   Wt 60.1 kg (132 lb 9.6 oz)   BMI 24.06 kg/m²      Physical Exam  Constitutional:       Comments: Talkative, but complains of nausea and glucose high and ketones large   HENT:      Mouth/Throat:      Mouth: Mucous membranes are moist.   Eyes:      Extraocular Movements: Extraocular movements intact.   Cardiovascular:      Comments: tachycardic  Pulmonary:      Effort: Pulmonary effort is normal.   Abdominal:      General: Abdomen is flat.   Musculoskeletal:      Cervical back: Normal range of motion.   Skin:     General: Skin is warm.   Neurological:      Mental Status: She is alert.          Lab  Lab Results   Component Value Date    HGBA1C 12.2 (A) 01/23/2025    HGBA1C 11.7 (A) 10/17/2024    HGBA1C 11.7 (A) 07/18/2024    HGBA1C 12.6 (A) 03/14/2024       Assessment/Plan   Alma Bar is a 19 y.o. female with type 1 diabetes.        Plan:    19 y 4 m F with T1D who presently with acute concerns for hyperglycemia and large ketonuria in the setting of poor medication administration, rising A1c, and weight loss. Refer to [facility] ED for evaluation of DKA. Scripts renewed and insulin plan as previous, given family preference and with safety in mind.     Insulin " Instructions  daily doses   HumaLOG Mix 75-25(U-100)Insuln 100 unit/mL (75-25) suspension   Last edited by Dilia Kirby RN on 1/23/2025 at 3:33 PM          If you are over 300 at bedtime or you are not eating take 3 units lispro      Time of Day Dose (units)   Humalog 75/25, (9pm)largest meal of the day 25   Lantus at 9pm 25   if over 300 at breakfast lunch or dinner:  Lispro 5       CGM Interpretation/Plan   14 day CGM download was reviewed in detail as documented above under GLUCOSE MONITORING and will be attached to chart.  A minimum of 72 hours of glucose data was used to inform the management plan outlined above.    Dilia Kirby RN

## 2025-01-24 ENCOUNTER — DOCUMENTATION (OUTPATIENT)
Dept: PEDIATRIC ENDOCRINOLOGY | Facility: HOSPITAL | Age: 20
End: 2025-01-24
Payer: COMMERCIAL

## 2025-01-24 ENCOUNTER — TELEPHONE (OUTPATIENT)
Dept: PEDIATRIC ENDOCRINOLOGY | Facility: HOSPITAL | Age: 20
End: 2025-01-24
Payer: COMMERCIAL

## 2025-01-24 NOTE — PROGRESS NOTES
I have called Alma to follow up as she was advised to come to the ED today in setting of hyperglycemia and pos ketones.  She couldn't go yet so I re emphasized need to come to the ED to get assessment for Diabetic ketoacidosis.  She reported she felt better ,last BG was 477 mg/dl took 25 units lantus and has no symptoms currently feeling good.  Ketones still large but drinking fluids.  Re emphasized coming to the ED for assessment and if she was not able to come to the ED for any reason to call us back tonight for further advice.    MD JESS Gutierrez I  Singh Morris Fellow

## 2025-01-24 NOTE — PROGRESS NOTES
I have tried reaching Alma back again for further recommendations as she didn't present to the ED.  I was going to suggest giving correction using lispro 5 units and checking for BG and ketones every 2-3 hours with calling me back to further recommend correction doses.  I tried calling her number [phone] multiple times but got directed to voice mail,home phone numebr not working, and  I tried calling seamus Leon number but directed to voicemail.  Left Alma a voicemail to call me back for further recommendations as she didn't get to present to the ED and would want to make sure she gets sick day management at home with corrections of insulin and ketone checks/also had sent earlier [employer] text.    MD JESS Gutierrez Fellow

## 2025-01-24 NOTE — TELEPHONE ENCOUNTER
Called Alma to follow up with how her blood sugar and ketones were doing following yesterday's clinic (see note).    Unable to reach via phone, left message:  Recommended Alma check her urine for ketones if blood sugar is still over 250, and call office immediately (number given) and have nurse paged if ketones are moderate-large.

## 2025-02-27 ENCOUNTER — APPOINTMENT (OUTPATIENT)
Dept: PEDIATRIC ENDOCRINOLOGY | Facility: CLINIC | Age: 20
End: 2025-02-27
Payer: COMMERCIAL

## 2025-04-10 ENCOUNTER — APPOINTMENT (OUTPATIENT)
Dept: PEDIATRIC ENDOCRINOLOGY | Facility: CLINIC | Age: 20
End: 2025-04-10
Payer: COMMERCIAL

## 2025-04-23 ENCOUNTER — APPOINTMENT (OUTPATIENT)
Dept: OPHTHALMOLOGY | Facility: CLINIC | Age: 20
End: 2025-04-23
Payer: COMMERCIAL

## 2025-07-30 DIAGNOSIS — E55.9 VITAMIN D DEFICIENCY: ICD-10-CM

## 2025-07-31 RX ORDER — CHOLECALCIFEROL (VITAMIN D3) 25 MCG
25 TABLET ORAL DAILY
Qty: 30 TABLET | Refills: 0 | Status: SHIPPED | OUTPATIENT
Start: 2025-07-31

## 2025-08-06 ENCOUNTER — SOCIAL WORK (OUTPATIENT)
Dept: PEDIATRIC ENDOCRINOLOGY | Facility: CLINIC | Age: 20
End: 2025-08-06
Payer: COMMERCIAL

## 2025-08-06 NOTE — PROGRESS NOTES
Patient was referred to  by Peds Endo team due to missed visits and the need to be seen. SW left a message for Alma that we need to see her in clinic. FLORINDA Grover, LISW-S #[phone].

## 2025-10-09 ENCOUNTER — APPOINTMENT (OUTPATIENT)
Dept: OPHTHALMOLOGY | Facility: CLINIC | Age: 20
End: 2025-10-09
Payer: COMMERCIAL

## 2025-10-30 ENCOUNTER — APPOINTMENT (OUTPATIENT)
Dept: PEDIATRIC ENDOCRINOLOGY | Facility: CLINIC | Age: 20
End: 2025-10-30
Payer: COMMERCIAL

## 2025-12-11 ENCOUNTER — APPOINTMENT (OUTPATIENT)
Dept: OPHTHALMOLOGY | Facility: CLINIC | Age: 20
End: 2025-12-11
Payer: COMMERCIAL